# Patient Record
Sex: MALE | Race: WHITE
[De-identification: names, ages, dates, MRNs, and addresses within clinical notes are randomized per-mention and may not be internally consistent; named-entity substitution may affect disease eponyms.]

---

## 2017-11-01 ENCOUNTER — HOSPITAL ENCOUNTER (EMERGENCY)
Dept: HOSPITAL 25 - UCEAST | Age: 27
Discharge: HOME | End: 2017-11-01
Payer: SELF-PAY

## 2017-11-01 VITALS — SYSTOLIC BLOOD PRESSURE: 133 MMHG | DIASTOLIC BLOOD PRESSURE: 66 MMHG
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
## 2017-11-01 DIAGNOSIS — S01.81XA: Primary | ICD-10-CM

## 2017-11-01 PROCEDURE — 99201: CPT

## 2017-11-01 PROCEDURE — G0463 HOSPITAL OUTPT CLINIC VISIT: HCPCS

## 2017-11-01 NOTE — UC
Laceration HPI





- HPI Summary


HPI Summary: 





Pt was using a utility knife to cut styrofoam and slipped and punctured the 

skin on his right jaw line. Bleeding stopped within minutes. Tdap was last year 

per pt. No fever, chills, numbness, tingling, or other pain 





- History Of Current Complaint


Chief Complaint: UCLowerExtremity


Stated Complaint: FACIAL LAC


Time Seen by Provider: 11/01/17 13:10


Hx Obtained From: Patient


Laceration Location: Face


Mechanism Of Injury: Sharp Trauma


Onset/Duration: Sudden Onset


Severity: Mild


Pain Intensity: 4


Pain Scale Used: 0-10 Numeric


Facial Trauma: 


  __________________________














  __________________________





 1 - 2mm superficial lac








- Allergies/Home Medications


Allergies/Adverse Reactions: 


 Allergies











Allergy/AdvReac Type Severity Reaction Status Date / Time


 


No Known Allergies Allergy   Verified 11/01/17 12:50














PMH/Surg Hx/FS Hx/Imm Hx


Previously Healthy: Yes





- Surgical History


Surgical History: Yes


Surgery Procedure, Year, and Place: LEFT SHOULDER SURGERY HX





- Family History


Known Family History: Positive: None





- Social History


Alcohol Use: Weekly


Substance Use Type: None


Smoking Status (MU): Never Smoked Tobacco





- Immunization History


Most Recent Tetanus Shot: 2016





Review of Systems


Constitutional: Negative


Skin: Other - Laceration to right jaw line


ENT: Negative


Respiratory: Negative


Cardiovascular: Negative


Is Patient Immunocompromised?: No


All Other Systems Reviewed And Are Negative: Yes





Physical Exam


Triage Information Reviewed: Yes


Appearance: Well-Appearing, Well-Nourished


Vital Signs: 


 Initial Vital Signs











Temp  98.3 F   11/01/17 12:44


 


Pulse  64   11/01/17 12:44


 


Resp  15   11/01/17 12:44


 


BP  133/66   11/01/17 12:44


 


Pulse Ox  100   11/01/17 12:44











Vital Signs Reviewed: Yes


ENT: Positive: Normal ENT inspection, Hearing grossly normal, Pharynx normal, 

Pharyngeal erythema


Dental Exam: Normal


Dental: Negative: Dental Fracture @


Neck: Positive: Supple, Nontender, No Lymphadenopathy


Respiratory: Positive: Chest non-tender, Lungs clear, Normal breath sounds, No 

respiratory distress


Cardiovascular: Positive: RRR, No Murmur


Skin: Positive: Other - 2mm superficial laceration to skin on right jaw line. 

No bleeding. No FB within wound.





Laceration Course/Dx





- Course/Dx


Course Of Treatment: Tdap is up to date.  Wound was cleaned with sterile 4x4 

and sterile saline.  Recommended putting in 1 or 2 sutures, but steri-strips or 

adhesive would be acceptable. Pt opted to have steri-strips applied and 

declined sutures. Two steri strips were applied.





- Differential Dx - Laceration/Wound


Differental Diagnoses: Abrasion, Laceration


Provider Diagnoses: 2mm Laceration to face





Discharge





- Discharge Plan


Condition: Stable


Disposition: HOME


Patient Education Materials:  Facial Laceration (ED)


Additional Instructions: 


Keep area clean and dry. If the wound opens further, becomes red, swollen, or 

painful - please call our office or go to ED. 








If you develop a fever, SOB, chest pain, new or worsening symptoms - please 

call our office or go to ED

## 2019-09-20 ENCOUNTER — HOSPITAL ENCOUNTER (EMERGENCY)
Dept: HOSPITAL 25 - UCEAST | Age: 29
Discharge: HOME | End: 2019-09-20
Payer: SELF-PAY

## 2019-09-20 VITALS — SYSTOLIC BLOOD PRESSURE: 137 MMHG | DIASTOLIC BLOOD PRESSURE: 89 MMHG
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
## 2019-09-20 DIAGNOSIS — F17.210: ICD-10-CM

## 2019-09-20 DIAGNOSIS — S61.011A: Primary | ICD-10-CM
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
## 2019-09-20 PROCEDURE — 12001 RPR S/N/AX/GEN/TRNK 2.5CM/<: CPT

## 2019-09-20 PROCEDURE — G0463 HOSPITAL OUTPT CLINIC VISIT: HCPCS

## 2019-09-20 PROCEDURE — 99211 OFF/OP EST MAY X REQ PHY/QHP: CPT

## 2019-09-20 NOTE — UC
Laceration HPI





- HPI Summary


HPI Summary: 





29-year-old male who lacerated the dorsal portion of his right thumb at work on 

a piece of metal.  His last tetanus was 2 years ago.





- History Of Current Complaint


Chief Complaint: UCUpperExtremity


Stated Complaint: finger LACERATION


Hx Obtained From: Patient


Laceration Location: Finger - Right Thumb


Mechanism Of Injury: Sharp Trauma


Onset/Duration: Sudden Onset


Severity: Moderate


Pain Intensity: 6


Aggravating Factors: Position


Related History: Occupational Injury, Dominant Hand Right





- Allergies/Home Medications


Allergies/Adverse Reactions: 


 Allergies











Allergy/AdvReac Type Severity Reaction Status Date / Time


 


No Known Allergies Allergy   Verified 09/20/19 10:19











Home Medications: 


 Home Medications





NK [No Home Medications Reported]  09/20/19 [History Confirmed 09/20/19]











PMH/Surg Hx/FS Hx/Imm Hx


Previously Healthy: Yes





- Surgical History


Surgical History: Yes


Surgery Procedure, Year, and Place: LEFT SHOULDER SURGERY HX





- Family History


Known Family History: Positive: None





- Social History


Occupation: Employed Full-time


Alcohol Use: Daily


Alcohol Amount: 2,3 beers


Substance Use Type: Marijuana


Substance Use Comment - Amount & Last Used: occasionally


Smoking Status (MU): Heavy Every Day Tobacco Smoker


Household Exposure Type: Cigarettes





- Immunization History


Most Recent Tetanus Shot: 2016





Review of Systems


All Other Systems Reviewed And Are Negative: Yes


Skin: Positive: Other - Laceration dorsal aspect right thumb.  Bleeding is 

controlled.


Is Patient Immunocompromised?: No





Physical Exam


Triage Information Reviewed: Yes


Appearance: Well-Appearing, No Pain Distress, Well-Nourished


Vital Signs: 


 Initial Vital Signs











Temp  99 F   09/20/19 10:06


 


Pulse  91   09/20/19 10:06


 


Resp  16   09/20/19 10:06


 


BP  137/89   09/20/19 10:06


 


Pulse Ox  100   09/20/19 10:06











Vital Signs Reviewed: Yes


Musculoskeletal: Positive: Strength Intact, ROM Intact, Other: - Good 

peripheral pulses, neuro sensation, and capillary refill.  Good finger strength 

with flexion and extension against resistance.  Full range of motion.  Patient 

has an approximately 1.5 cm laceration across the dorsal aspect of his right 

thumb.  Bleeding is controlled.


Neurological Exam: Normal


Psychological Exam: Normal


Skin: Positive: Other - See above notes





Laceration Repair





- Laceration Repair


  ** 1


Description: Linear


Laceration Size After Repair: Length (cm) - 1.5 cm across the dorsal aspect of 

his right thumb.


Modified For Repair: No


Type Injection: Local


Anesthesia Used: 1.0% Lido


Cleansing Completed Via Routine Prep: Yes


Irrigation With Pressure Irrigation Device: Yes


Closure Material: Sutures


Suture Of: SQ


Suture Type: Prolene - Sutures placed numbered 4 with 40 prolene.  Patient 

tolerated procedure well.





Laceration Course/Dx





- Course/Dx


Course Of Treatment: 





A dry sterile bulky dressing was applied.  Tetanus is up-to-date.  No work 

until Monday.  He is to follow-up with Dr. Patel in 10 days for suture removal 

or sooner if any signs of infection which were reviewed with the patient.





- Diagnosis


Provider Diagnosis: 


 Laceration of thumb








Discharge ED





- Sign-Out/Discharge


Documenting (check all that apply): Patient Departure


All imaging exams completed and their final reports reviewed: No Studies





- Discharge Plan


Condition: Good


Disposition: HOME


Patient Education Materials:  Care For Your Stitches (DC)


Forms:  *Work Release


Referrals: 


Dominik Patel MD [Primary Care Provider] - 


Additional Instructions: 


Elevate and apply pressure if the area starts bleeding.  Keep the dressing on 

for 24 hours.  Watch for signs of infection such as hot, red, tender, pus 

drainage, red streaks up your thumb or hand.  Follow-up sooner if any concerns.

  Make an appointment with your primary care provider for suture removal in 10 

days.





- Billing Disposition and Condition


Condition: GOOD


Disposition: Home